# Patient Record
Sex: MALE | Race: BLACK OR AFRICAN AMERICAN | NOT HISPANIC OR LATINO | URBAN - METROPOLITAN AREA
[De-identification: names, ages, dates, MRNs, and addresses within clinical notes are randomized per-mention and may not be internally consistent; named-entity substitution may affect disease eponyms.]

---

## 2019-08-11 ENCOUNTER — EMERGENCY (EMERGENCY)
Facility: HOSPITAL | Age: 49
LOS: 1 days | Discharge: HOME | End: 2019-08-11
Attending: EMERGENCY MEDICINE | Admitting: EMERGENCY MEDICINE
Payer: COMMERCIAL

## 2019-08-11 VITALS
TEMPERATURE: 98 F | SYSTOLIC BLOOD PRESSURE: 144 MMHG | DIASTOLIC BLOOD PRESSURE: 83 MMHG | HEART RATE: 59 BPM | OXYGEN SATURATION: 99 % | HEIGHT: 67 IN | RESPIRATION RATE: 16 BRPM | WEIGHT: 199.08 LBS

## 2019-08-11 PROCEDURE — 99282 EMERGENCY DEPT VISIT SF MDM: CPT

## 2019-08-11 PROCEDURE — 99283 EMERGENCY DEPT VISIT LOW MDM: CPT

## 2019-08-11 NOTE — ED PROVIDER NOTE - CARE PLAN
Principal Discharge DX:	Biceps muscle strain, left, initial encounter Principal Discharge DX:	Biceps muscle strain, left, initial encounter  Goal:	possible partial tendon rupture

## 2019-08-11 NOTE — ED PROVIDER NOTE - OBJECTIVE STATEMENT
50 y/o M with hx of HTN presents with c/o L upper arm pain x 30 mins. Pt states that he was pushing/moving furniture and felt/heard a "pop" in his L upper arm. C/o tightness and discomfort in his L upper arm only with flexion of arm. Pt is R hand dominant. Denies numbness, tingling, direct trauma of arm, open wounds or other symptoms/injuries.

## 2019-08-11 NOTE — ED PROVIDER NOTE - MUSCULOSKELETAL, MLM
L arm: non tender L arm/biceps on exam, FROM LUE, able to flex/extend LUE without any difficulty,  strength intact, skin intact, no erythema/ecchymosis/swelling noted, fingers warm & mobile, cap refill<2sec, pulses and sensation intact, no muscle or joint tenderness on exam, NVI L arm: +mild ttp L arm/biceps on exam, FROM LUE, able to flex/extend LUE without any difficulty,  strength intact, skin intact, no erythema/ecchymosis/swelling noted, fingers warm & mobile, cap refill<2sec, no obvious deformities noted, pulses and sensation intact, no muscle or joint tenderness on exam, NVI

## 2019-08-11 NOTE — ED PROVIDER NOTE - CLINICAL SUMMARY MEDICAL DECISION MAKING FREE TEXT BOX
50 y/o M with pain/tightness in his L upper arm/biceps area sp feeling/hearing pop in his L biceps, FROM intact in LUE, NVI, non tender to palpation, pt refused pain meds in ED, likely biceps muscle strain vs partial rupture, advised RICE, WBAT, nsaids, sling, will dc home and f/u ortho. 50 y/o M with pain/tightness in his L upper arm/biceps area sp feeling/hearing pop in his L biceps, FROM intact in LUE, NVI, non tender to palpation, pt refused pain meds in ED, likely biceps muscle strain vs partial tendon rupture, advised RICE, WBAT, nsaids, sling, will dc home and f/u ortho.

## 2019-08-11 NOTE — ED ADULT NURSE NOTE - NSIMPLEMENTINTERV_GEN_ALL_ED
Implemented All Universal Safety Interventions:  [place] to call system. Call bell, personal items and telephone within reach. Instruct patient to call for assistance. Room bathroom lighting operational. Non-slip footwear when patient is off stretcher. Physically safe environment: no spills, clutter or unnecessary equipment. Stretcher in lowest position, wheels locked, appropriate side rails in place.

## 2019-08-11 NOTE — ED PROVIDER NOTE - ATTENDING CONTRIBUTION TO CARE
49y M here with pain to distal medial insertion of the L biceps tendon after moving furniture today. Pain is worse w Flex/ext of biceps though is able to w FROM. No TTP over proximal insertion. No bruising or swelling. No limitation of ROM at shoulder or elbow. No forearm pain or involvement. SUspect partial tear of one head biceps vs muscle strain. Advised rest, no heavy lifting, ice, elevate, NSAIDs, OP ortho FU. Given sling for comfort but advised to continue to do ROM exercises with shoulder to avoid adhesive capsulitis.

## 2019-08-11 NOTE — ED PROVIDER NOTE - PROGRESS NOTE DETAILS
Pt refused pain meds in ED at this time. Pt advised that no imaging warranted at this time, no bony tenderness, likely muscle strain vs partial bicep tendon rupture, NVI, will dc home with sling, nsaids, RICE, f/u ortho.

## 2019-08-11 NOTE — ED ADULT NURSE NOTE - OBJECTIVE STATEMENT
pt was lifting furniture and felt a pop pain worsening to left elbow,  no bruising noted strength equel bilaterally able to flex bicep muscle

## 2022-08-15 PROBLEM — I10 ESSENTIAL (PRIMARY) HYPERTENSION: Chronic | Status: ACTIVE | Noted: 2019-08-11

## 2022-08-19 ENCOUNTER — OUTPATIENT (OUTPATIENT)
Dept: OUTPATIENT SERVICES | Facility: HOSPITAL | Age: 52
LOS: 1 days | End: 2022-08-19

## 2022-08-19 DIAGNOSIS — Z01.810 ENCOUNTER FOR PREPROCEDURAL CARDIOVASCULAR EXAMINATION: ICD-10-CM

## 2022-08-19 DIAGNOSIS — Z01.812 ENCOUNTER FOR PREPROCEDURAL LABORATORY EXAMINATION: ICD-10-CM

## 2022-08-19 DIAGNOSIS — R22.32 LOCALIZED SWELLING, MASS AND LUMP, LEFT UPPER LIMB: ICD-10-CM

## 2022-08-19 PROCEDURE — 93010 ELECTROCARDIOGRAM REPORT: CPT

## 2022-08-26 ENCOUNTER — OUTPATIENT (OUTPATIENT)
Dept: OUTPATIENT SERVICES | Facility: HOSPITAL | Age: 52
LOS: 1 days | End: 2022-08-26

## 2022-08-26 PROCEDURE — 88305 TISSUE EXAM BY PATHOLOGIST: CPT | Mod: 26

## 2022-09-05 DIAGNOSIS — R22.2 LOCALIZED SWELLING, MASS AND LUMP, TRUNK: ICD-10-CM

## 2022-09-05 DIAGNOSIS — D17.22 BENIGN LIPOMATOUS NEOPLASM OF SKIN AND SUBCUTANEOUS TISSUE OF LEFT ARM: ICD-10-CM

## 2023-11-15 NOTE — ED ADULT NURSE NOTE - LOCATION
November 15, 2023 8:32 AM JO[facility]   LVM for Pt to CB and confirm no previous radiation or outside records needed  8:43 AM  CB from Pt confirming above     
elbow